# Patient Record
Sex: FEMALE | URBAN - METROPOLITAN AREA
[De-identification: names, ages, dates, MRNs, and addresses within clinical notes are randomized per-mention and may not be internally consistent; named-entity substitution may affect disease eponyms.]

---
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
## 2022-02-16 NOTE — TELEPHONE ENCOUNTER
---------------------  From: Luisa Linder MD   To: [employer] ([id number]_[place]);     Sent: 10/26/2021 4:49:54 PM CDT  Subject: p     pt's dad is unable to get the phone to connect with video for our appointment, pt is autistic and does not speak on the phone so will need to have appointment cancelled and a clinic appointment scheduled.  did not hear any coughing in the background during my brief conversation with dad.  chart review shows pt's dad has requested inhaler and promethazine with codeine for patient in the past.      will have appointment today be cancelled and  have patient be brought in for a clinic appointment for further evaluation and treamentAppointment cancelled.

## 2022-02-16 NOTE — NURSING NOTE
Comprehensive Intake Entered On:  10/26/2021 2:35 PM CDT    Performed On:  10/26/2021 2:31 PM CDT by Brenda Prasad               Summary   Chief Complaint :   Verbal consent given for video visit. c/o allergies- itchy eyes, runny nose, chest congestion.    Brenda Prasad - 10/26/2021 2:31 PM CDT   Health Status   Allergies Verified? :   Yes   Medication History Verified? :   Yes   Medical History Verified? :   Yes   Pre-Visit Planning Status :   Completed   Tobacco Use? :   Never smoker   Brenda Prasad - 10/26/2021 2:31 PM CDT   Consents   Consent for Immunization Exchange :   Consent Granted   Consent for Immunizations to Providers :   Consent Granted   Brneda Prasad - 10/26/2021 2:31 PM CDT   Meds / Allergies   (As Of: 10/26/2021 2:35:03 PM CDT)   Allergies (Active)   No Known Medication Allergies  Estimated Onset Date:   Unspecified ; Created By:   Carolina Niño; Reaction Status:   Active ; Category:   Drug ; Substance:   No Known Medication Allergies ; Type:   Allergy ; Updated By:   Carolina Niño; Reviewed Date:   10/26/2021 2:33 PM CDT        Medication List   (As Of: 10/26/2021 2:35:03 PM CDT)   No Known Home Medications     Brenda Prasad - 10/26/2021 2:33:32 PM      Prescription/Discharge Order    albuterol  :   albuterol ; Status:   Completed ; Ordered As Mnemonic:   albuterol 90 mcg/inh inhalation aerosol ; Simple Display Line:   2 puff(s), Inhale, q4-6 hrs, PRN: for cough, 1 EA, 2 Refill(s) ; Ordering Provider:   Sage Wang MD; Catalog Code:   albuterol ; Order Dt/Tm:   5/19/2021 12:12:00 PM CDT          codeine-promethazine  :   codeine-promethazine ; Status:   Completed ; Ordered As Mnemonic:   codeine-promethazine 10 mg-6.25 mg/5 mL oral syrup ; Simple Display Line:   5 mL, Oral, hs, 240 mL, 0 Refill(s) ; Ordering Provider:   Sage Wang MD; Catalog Code:   codeine-promethazine ; Order Dt/Tm:   5/19/2021 12:11:47 PM CDT          loratadine  :   loratadine ; Status:    Completed ; Ordered As Mnemonic:   loratadine 10 mg oral capsule ; Simple Display Line:   10 mg, 1 cap(s), Oral, daily, for 90 day(s), 90 cap(s), 1 Refill(s) ; Ordering Provider:   Sage Wang MD; Catalog Code:   loratadine ; Order Dt/Tm:   4/26/2021 4:03:36 PM CDT          montelukast  :   montelukast ; Status:   Completed ; Ordered As Mnemonic:   Singulair 10 mg oral tablet ; Simple Display Line:   10 mg, 1 tab(s), Oral, daily, for 90 day(s), 90 tab(s), 1 Refill(s) ; Ordering Provider:   Sage Wang MD; Catalog Code:   montelukast ; Order Dt/Tm:   4/26/2021 4:03:37 PM CDT

## 2022-02-16 NOTE — NURSING NOTE
Comprehensive Intake Entered On:  4/26/2021 2:25 PM CDT    Performed On:  4/26/2021 2:24 PM CDT by Carolina Niño               Summary   Chief Complaint :   Cough and congestion with gree/yellow mucus   Carolina Niño - 4/26/2021 2:24 PM CDT   Health Status   Allergies Verified? :   Yes   Medication History Verified? :   Yes   Medical History Verified? :   No   Pre-Visit Planning Status :   Not completed   Carolina Niño - 4/26/2021 2:24 PM CDT   Consents   Consent for Immunization Exchange :   Consent Granted   Consent for Immunizations to Providers :   Consent Granted   Carolina Niño - 4/26/2021 2:24 PM CDT   Meds / Allergies   (As Of: 4/26/2021 2:25:38 PM CDT)   Allergies (Active)   No Known Medication Allergies  Estimated Onset Date:   Unspecified ; Created By:   Carolina Niño; Reaction Status:   Active ; Category:   Drug ; Substance:   No Known Medication Allergies ; Type:   Allergy ; Updated By:   Carolina Niño; Reviewed Date:   4/26/2021 2:25 PM CDT        Medication List   (As Of: 4/26/2021 2:25:38 PM CDT)   Home Meds    montelukast  :   montelukast ; Status:   Documented ; Ordered As Mnemonic:   Singulair 10 mg oral tablet ; Simple Display Line:   10 mg, 1 tab(s), Oral, daily, 0 Refill(s) ; Catalog Code:   montelukast ; Order Dt/Tm:   4/26/2021 2:20:18 PM CDT          loratadine  :   loratadine ; Status:   Documented ; Ordered As Mnemonic:   loratadine 10 mg oral capsule ; Simple Display Line:   10 mg, 1 cap(s), Oral, daily, 0 Refill(s) ; Catalog Code:   loratadine ; Order Dt/Tm:   4/26/2021 2:21:32 PM CDT          codeine-promethazine  :   codeine-promethazine ; Status:   Documented ; Ordered As Mnemonic:   codeine-promethazine ; Simple Display Line:   Oral, q4 hrs, 0 Refill(s) ; Catalog Code:   codeine-promethazine ; Order Dt/Tm:   4/26/2021 2:22:27 PM CDT          albuterol  :   albuterol ; Status:   Documented ; Ordered As Mnemonic:   Ventolin HFA ; Simple Display Line:   Inhale, q6 hrs, 0 Refill(s) ;  Catalog Code:   albuterol ; Order Dt/Tm:   4/26/2021 2:19:51 PM CDT            ID Risk Screen   Recent Travel History :   No recent travel   Family Member Travel History :   No recent travel   Other Exposure to Infectious Disease :   Unknown   COVID-19 Testing Status :   No positive COVID-19 test   Carolina Niño - 4/26/2021 2:24 PM CDT   Social History   Social History   (As Of: 4/26/2021 2:25:38 PM CDT)   Tobacco:        Never (less than 100 in lifetime)   (Last Updated: 4/26/2021 2:24:57 PM CDT by Carolina Niño)          Electronic Cigarette/Vaping:        Electronic Cigarette Use: Never.   (Last Updated: 4/26/2021 2:25:02 PM CDT by Carolina Niño)

## 2022-02-16 NOTE — PROGRESS NOTES
Patient:   SHIRA DEVINE            MRN: [id number]            FIN: [id number]               Age:   21 years     Sex:  Female     :  2000   Associated Diagnoses:   Autism; Cough   Author:   Sage Wang MD      Visit Information      Date of Service: 2021 02:08 pm  Performing Location: [facility]  Encounter#: [id number]      Primary Care Provider (PCP):  NONE ,       Referring Provider:  Sage Wang MD    NPI# [id number]      Chief Complaint   2021 2:24 PM CDT    Cough and congestion with gree/yellow mucus      Subjective   Chief complaint 2021 2:24 PM CDT    Cough and congestion with gree/yellow mucus.     history of asthma and uses meds but has run out.  now cough and worsening congestion,  mom   and  in a few days  patient is autistic so visit done with dad.  he does not think nasal testing is alternative    video visit from 210 to 220. patient had consented to video         Health Status   Allergies:    Allergic Reactions (Selected)  No Known Medication Allergies   Medications:  (Selected)   Prescriptions  Prescribed  Singulair 10 mg oral tablet: = 1 tab(s) ( 10 mg ), Oral, daily, # 90 tab(s), 1 Refill(s), Type: Maintenance, Pharmacy: [facility] #[id number], 1 tab(s) Oral daily,x90 day(s)  albuterol 90 mcg/inh inhalation aerosol: 2 puff(s), Inhale, q4-6 hrs, PRN: for cough, # 1 EA, 2 Refill(s), Type: Maintenance, Pharmacy: [facility] #[id number], 2 puff(s) Inhale q4-6 hrs,PRN:for cough  codeine-promethazine 10 mg-6.25 mg/5 mL oral syrup: 5 mL, Oral, hs, # 120 mL, 0 Refill(s), Type: Acute, Pharmacy: [facility] #[id number], 5 mL Oral hs  loratadine 10 mg oral capsule: = 1 cap(s) ( 10 mg ), Oral, daily, # 90 cap(s), 1 Refill(s), Type: Maintenance, Pharmacy: [facility] #[id number], 1 cap(s) Oral daily,x90 day(s)  predniSONE 20 mg oral tablet: = 1 tab(s) ( 20 mg ), Oral, daily, x 7 day(s), # 7 tab(s), 0 Refill(s), Type: Acute, Pharmacy:  [facility] #[id number], 1 tab(s) Oral daily,x7 day(s)  Documented Medications  Documented  Ventolin HFA: Inhale, q6 hrs, 0 Refill(s), Type: Maintenance,    Medications          *denotes recorded medication          *Ventolin HFA: Inhale, q6 hrs, 0 Refill(s).          albuterol 90 mcg/inh inhalation aerosol: 2 puff(s), Inhale, q4-6 hrs, PRN: for cough, 1 EA, 2 Refill(s).          codeine-promethazine 10 mg-6.25 mg/5 mL oral syrup: 5 mL, Oral, hs, 120 mL, 0 Refill(s).          loratadine 10 mg oral capsule: 10 mg, 1 cap(s), Oral, daily, for 90 day(s), 90 cap(s), 1 Refill(s).          Singulair 10 mg oral tablet: 10 mg, 1 tab(s), Oral, daily, for 90 day(s), 90 tab(s), 1 Refill(s).          predniSONE 20 mg oral tablet: 20 mg, 1 tab(s), Oral, daily, for 7 day(s), 7 tab(s), 0 Refill(s).       Problem list:    All Problems  Autism / [id number] / Confirmed      Impression and Plan   Assessment and Plan:          Diagnosis: Autism (YJD30-RV F84.0), Cough (UXX13-SN R05).    Orders      (Selected)   Prescriptions  Prescribed  Singulair 10 mg oral tablet: = 1 tab(s) ( 10 mg ), Oral, daily, # 90 tab(s), 1 Refill(s), Type: Maintenance, Pharmacy: [facility] #[id number], 1 tab(s) Oral daily,x90 day(s)  albuterol 90 mcg/inh inhalation aerosol: 2 puff(s), Inhale, q4-6 hrs, PRN: for cough, # 1 EA, 2 Refill(s), Type: Maintenance, Pharmacy: [facility] #[id number], 2 puff(s) Inhale q4-6 hrs,PRN:for cough  codeine-promethazine 10 mg-6.25 mg/5 mL oral syrup: 5 mL, Oral, hs, # 120 mL, 0 Refill(s), Type: Acute, Pharmacy: [facility] #[id number], 5 mL Oral hs  loratadine 10 mg oral capsule: = 1 cap(s) ( 10 mg ), Oral, daily, # 90 cap(s), 1 Refill(s), Type: Maintenance, Pharmacy: [facility] #[id number], 1 cap(s) Oral daily,x90 day(s)  predniSONE 20 mg oral tablet: = 1 tab(s) ( 20 mg ), Oral, daily, x 7 day(s), # 7 tab(s), 0 Refill(s), Type: Acute, Pharmacy: [facility] #[id number], 1 tab(s) Oral daily,x7 day(s).      will have patient seen if not better or if further symptoms.     .

## 2022-02-16 NOTE — PROGRESS NOTES
"   Patient:   SHIRA EDVINE            MRN: [id number]            FIN: [id number]               Age:   21 years     Sex:  Female     :  2000   Associated Diagnoses:   Autism; Bronchitis; Cough   Author:   Dalton PICKARD, Angel FLORES      Visit Information      Date of Service: 2021 11:00 am  Performing Location: [facility]  Encounter#: [id number]      Primary Care Provider (PCP):  NONE ,    Visit type:  Video Visit via [employer] or [employer].    Participants in room during visit:  2 (patient and her father)_   Location of patient:  _home  Location of provider:  _ (Clinic office   Video Start Time:  _1138  Video End Time:   _1145    Today's visit was conducted via video conference due to the COVID-19 pandemic.  The patient's consent to proceed with a video visit has been obtained and documented.      Chief Complaint   2021 11:29 AM CDT    Verbal consent given for a video visit.  Pt Father states pt is still \"coughing pretty bad\" and states she needs \"more medications\" for upcoming \"autism camp\"        History of Present Illness   Patient is a _21 year old female_ who is being evaluated via a billable video visit.  patient has autism, father provides most of the history for the visit  she had video visit on  for cough, was given albuterol MDI, singulair, loratadine and burst of prednisone, her sxs improved a little    she had negative Covid test on 5/3  she is still coughing a lot, and still has some chest congestion  she will leaving today for autism camp for 2 weeks      Review of Systems   Constitutional:  Negative.    Ear/Nose/Mouth/Throat:  Nasal congestion.    Respiratory:  Cough.       Health Status   Allergies:    Allergic Reactions (Selected)  No Known Medication Allergies   Medications:  (Selected)   Prescriptions  Prescribed  Singulair 10 mg oral tablet: = 1 tab(s) ( 10 mg ), Oral, daily, # 90 tab(s), 1 Refill(s), Type: Maintenance, Pharmacy: [facility] #[id number], 1 tab(s) Oral " daily,x90 day(s)  albuterol 90 mcg/inh inhalation aerosol: 2 puff(s), Inhale, q4-6 hrs, PRN: for cough, # 1 EA, 2 Refill(s), Type: Maintenance, Pharmacy: [facility] #[id number], 2 puff(s) Inhale q4-6 hrs,PRN:for cough  codeine-promethazine 10 mg-6.25 mg/5 mL oral syrup: 5 mL, Oral, hs, # 120 mL, 0 Refill(s), Type: Acute, Pharmacy: [facility] #[id number], 5 mL Oral hs  loratadine 10 mg oral capsule: = 1 cap(s) ( 10 mg ), Oral, daily, # 90 cap(s), 1 Refill(s), Type: Maintenance, Pharmacy: [facility] #[id number], 1 cap(s) Oral daily,x90 day(s)   Problem list:    All Problems  Autism / SNOMED CT [id number] / Confirmed      Histories   Past Medical History:    No active or resolved past medical history items have been selected or recorded.   Family History:    No family history items have been selected or recorded.   Procedure history:    No active procedure history items have been selected or recorded.   Social History:        Electronic Cigarette/Vaping Assessment            Electronic Cigarette Use: Never.      Tobacco Assessment            Never (less than 100 in lifetime)        Physical Examination   General:  No acute distress.    Respiratory:  Respirations are non-labored.    Psychiatric:  Cooperative, Appropriate mood & affect, Normal judgment.       Impression and Plan   Diagnosis     Autism (XAE26-OC F84.0).     Bronchitis (VCC29-KL J40).     Cough (ZRX12-NK R05).     Summary:  continue with current medications, will add zpak and will refill her codeine-promethazine cough syrup  follow up if not improving.    Orders     Orders   Pharmacy:  Zithromax Z-Jeremiah 250 mg oral tablet (Prescribe): Take 2 tablets on Day 1 and then 1 tablet, Oral, daily, Instructions: until finished, # 6 tab(s), 0 Refill(s), Type: Maintenance, Pharmacy: [facility] #[id number], Take 2 tablets on Day 1 and then 1 tablet Oral daily,Instr:until finished.     Orders   Pharmacy:  codeine-promethazine 10 mg-6.25 mg/5 mL oral  syrup (Prescribe): 5 mL, Oral, hs, # 120 mL, 0 Refill(s), Type: Maintenance, Pharmacy: [facility] #[id number], 5 mL Oral hs  codeine-promethazine 10 mg-6.25 mg/5 mL oral syrup (Modify): 5 mL, Oral, hs, # 120 mL, 0 Refill(s), Type: Hard Stop, Pharmacy: [facility] #[id number].     Orders   Pharmacy:  Zithromax Z-Jeremiah 250 mg oral tablet (Prescribe): Take 2 tablets on Day 1 and then 1 tablet, Oral, daily, Instructions: until finished, # 6 tab(s), 0 Refill(s), Type: Maintenance, Pharmacy: [facility] #[id number], Take 2 tablets on Day 1 and then 1 tablet Oral daily,Instr:until finished  codeine-promethazine 10 mg-6.25 mg/5 mL oral syrup (Prescribe): 5 mL, Oral, hs, # 120 mL, 0 Refill(s), Type: Maintenance, Pharmacy: [facility] #[id number], 5 mL Oral hs  codeine-promethazine 10 mg-6.25 mg/5 mL oral syrup (Discontinue)  Zithromax Z-Jeremiah 250 mg oral tablet (Discontinue).     Orders   Charges (Evaluation and Management):  [id number] office o/p est low 20-29 min (Charge) (Order): Quantity: 1, Cough  Bronchitis  Autism.

## 2022-02-16 NOTE — TELEPHONE ENCOUNTER
---------------------  From: Albert Cook (Phone Messages Pool ([id number]))   To: [facility] Message Pool ([id number]);     Sent: 4/26/2021 4:03:24 PM CDT !  Subject: General Message     Phone Message    PCP:   ANNEL             Time of Call:  400pm                                        Person Calling:  Pt   Phone number:  ; ok to leave detailed message     Note:   [email or web address] (did [facility] Pt is also requesting [facility] to send 300mL of the Codeine (1 month supply) instead of 120mL. It is cheaper ($4) for a month supply vs paying $40 for 120 ml. Please advise.   He is on his way now to the pharmacy, he is waiting for a call back before going in.     Last office visit and reason:  04/26/2021; Cough.---------------------  From: Sangeetha Pond CMA ([facility] Message Pool ([id number]))   To: Sage Wang MD;     Sent: 4/26/2021 4:04:15 PM CDT  Subject: FW: General Message---------------------  From: Albert Cook (Phone Messages Pool ([id number]))   To: Phone Messages Pool ([id number]);     Sent: 4/26/2021 4:40:33 PM CDT  Subject: FW: General Message---------------------  From: Albert Cook (Phone Messages Pool ([id number]))   To: [facility] Message Pool ([id number]);     Sent: 4/26/2021 4:43:00 PM CDT  Subject: FW: General Message     Henrique from [facility] called stating that 300 mL is a very large amount to dispense. Henrique also informed me that pt lied about coverage, it is not expensive to get a few days supplies. Henrique is concerned since this medication can be sold for 100s of dollars just for small amounts. I told him to cancel the Rx and I'll have [facility] send in a few day supplies. I also called [facility] in [place] to cancel all Rxs since pt chose to come to the [facility] in [place], [place].---------------------  From: Sangeetha Pond CMA  Message Pool (32224_[facility]))   To: Sage Wang MD;     Sent: 4/26/2021 4:47:44 PM CDT  Subject: FW: General Message     FYI- sent the new request for the medication with your original amount you prescribed for you to sign off on.

## 2022-02-16 NOTE — TELEPHONE ENCOUNTER
---------------------  From: Katy Bee LPN (Phone Messages Pool ([id number]))   To: [facility] Provider Pool ([id number]_[facility]);     Sent: 5/19/2021 3:13:09 PM CDT  Subject: pharmacy change     ANNEL left for the day    Phone Message    PCP:   none      Time of Call:  3:01pm       Person Calling:  pt mat White  Phone number:  [phone]    Note:   Dad calling stating [facility] had sent Rx's to [facility] for promethazine with codeine and albuterol. Dad says [facility] does not carry promethazine with codeine anymore and he needs Rx sent to [facility] in [facility] (pharmacy added to pt's list).    Dad says pt is going to a camp and bus leaves in about 40 minutes so he needs this done ASAP.    Please advise if Rx can be resent to [facility] in [place]    Last office visit and reason:  5/7/1 video visit, cough. bronchitis, autism---------------------  From: Pedro PICKARD, Jania RIOS (Advanced Practice Provider Pool ([id number]))   To: Phone Messages Pool ([id number]);     Sent: 5/19/2021 3:33:44 PM CDT  Subject: RE: pharmacy change     1. Please call pt back and discuss with pt, not dad.   We do not have consent on file to talk to dad re: his care as he is 21.  I understand he has autism but we still do not have consent on file.   2.  He has filled all of the RX that we have provided him through [facility] and [facility] per [facility] database review, so I will not send more medication in.  I would not recommend ongoing use of promethazine with codeine.   3. If he is having ongoing cough he should be evaluated at the clinic in person.---------------------  From: Katy Bee LPN (Phone Messages Pool ([id number]))   To: [facility] Message Pool ([id number]);     Sent: 5/19/2021 3:47:52 PM CDT  Subject: FW: pharmacy change     Dad called back requesting Rx be sent elsewhere. Told dad we need to speak with pt since she is over 18. Dad says she is autistic and does not talk.  Dad became upset and hung up on me.    Will forward for [facility] to review tomorrow.---------------------  From: Sangeetha Pond CMA ([facility] Message Pool (32224_[facility]))   To: Sage Wang MD;     Sent: 5/20/2021 9:17:42 AM CDT  Subject: FW: pharmacy change---------------------  From: Sage Wang MD   To: [facility] Message Pool ([address]224_[place]);     Sent: 5/20/2021 9:32:34 AM CDT  Subject: RE: pharmacy change     due to messages from pharmacy I would need to see patient to consider any further RX

## 2022-02-16 NOTE — PROGRESS NOTES
Chief Complaint    Verbal consent given for video visit. Needing refill of albuterol inhaler and cough syrup. Is still at camp for 2 more weeks and cough has continued. Dad, Christopher, is currently with pt. Can send to [facility].  History of Present Illness       Telephone visit 1205 to 12:20 PM       Talked to dad.  His daughter is autistic and does not do well on the phone.  He would like a refill of her cough medicines and syrup.  She is evidently attending a 2-week day camp for autism.  I note that I talked to him on April 26 and the need a follow-up visit May 7 with her partner.  Assessment/Plan       1. Cough (R05)         Initially I agreed to refill the medication suggested he bring her in before any further refills.  Information from pharmacist in [facility] is that had a plethora of prescriptions for promethazine with codeine.  Someone in [facility] was buying it from other people.  So patient will need to come in for further discussion before prescribing   Patient Information     Name:SHIRA DEVINE      Address:      [address] [place] [address]     Sex:Female     YOB: 2000     [phone]     MRN:[id number]     FIN:[id number]     Location:[facility]     Date of Service:05/19/2021      Primary Care Physician:       NONE ,       Attending Physician:       Sage Wang MD, [phone]  Problem List/Past Medical History    Ongoing     Autism    Historical     No qualifying data  Medications    albuterol 90 mcg/inh inhalation aerosol, 2 puff(s), Inhale, q4-6 hrs, PRN, 2 refills    codeine-promethazine 10 mg-6.25 mg/5 mL oral syrup, 5 mL, Oral, hs    loratadine 10 mg oral capsule, 10 mg= 1 cap(s), Oral, daily, 1 refills    Singulair 10 mg oral tablet, 10 mg= 1 tab(s), Oral, daily, 1 refills  Allergies    No Known Medication Allergies  Social History    Smoking Status     Never smoker     Electronic Cigarette/Vaping      Electronic Cigarette Use: Never.     Tobacco      Never  (less than 100 in lifetime)

## 2022-02-16 NOTE — NURSING NOTE
Comprehensive Intake Entered On:  10/26/2021 4:36 PM CDT    Performed On:  10/26/2021 4:29 PM CDT by Marlen Christine               Summary   Chief Complaint :   Video Visit consent. Spoke with dad as pt has autism. He reports pt has cough, itchy eyes, nasal and chest congestion. She has been coughing up green and yellow phlegm x 2 weeks. .   Marlen Christine - 10/26/2021 4:29 PM CDT   Health Status   Allergies Verified? :   Yes   Medication History Verified? :   Yes   Marlen Christine - 10/26/2021 4:29 PM CDT   Consents   Consent for Immunization Exchange :   Consent Granted   Consent for Immunizations to Providers :   Consent Granted   Marlen Christine - 10/26/2021 4:29 PM CDT   Meds / Allergies   (As Of: 10/26/2021 4:36:01 PM CDT)   Allergies (Active)   No Known Medication Allergies  Estimated Onset Date:   Unspecified ; Created By:   Carolina Niño; Reaction Status:   Active ; Category:   Drug ; Substance:   No Known Medication Allergies ; Type:   Allergy ; Updated By:   Carolina Niño; Reviewed Date:   10/26/2021 4:34 PM CDT        Medication List   (As Of: 10/26/2021 4:36:01 PM CDT)   No Known Home Medications     Brenda Prasad  - 10/26/2021 2:33:32 PM      Prescription/Discharge Order    albuterol  :   albuterol ; Status:   Completed ; Ordered As Mnemonic:   albuterol 90 mcg/inh inhalation aerosol ; Simple Display Line:   2 puff(s), Inhale, q4-6 hrs, PRN: for cough, 1 EA, 2 Refill(s) ; Ordering Provider:   Sage Wang MD; Catalog Code:   albuterol ; Order Dt/Tm:   5/19/2021 12:12:00 PM CDT          codeine-promethazine  :   codeine-promethazine ; Status:   Completed ; Ordered As Mnemonic:   codeine-promethazine 10 mg-6.25 mg/5 mL oral syrup ; Simple Display Line:   5 mL, Oral, hs, 240 mL, 0 Refill(s) ; Ordering Provider:   Sage Wang MD; Catalog Code:   codeine-promethazine ; Order Dt/Tm:   5/19/2021 12:11:47 PM CDT          loratadine  :   loratadine ; Status:   Completed ; Ordered As Mnemonic:    loratadine 10 mg oral capsule ; Simple Display Line:   10 mg, 1 cap(s), Oral, daily, for 90 day(s), 90 cap(s), 1 Refill(s) ; Ordering Provider:   Sage Wang MD; Catalog Code:   loratadine ; Order Dt/Tm:   4/26/2021 4:03:36 PM CDT          montelukast  :   montelukast ; Status:   Completed ; Ordered As Mnemonic:   Singulair 10 mg oral tablet ; Simple Display Line:   10 mg, 1 tab(s), Oral, daily, for 90 day(s), 90 tab(s), 1 Refill(s) ; Ordering Provider:   Sage Wang MD; Catalog Code:   montelukast ; Order Dt/Tm:   4/26/2021 4:03:37 PM CDT

## 2022-02-16 NOTE — TELEPHONE ENCOUNTER
---------------------  From: Soledad Nieto CMA   To: ЕЛЕНА Message Pool ([id number]_[facility]);     Sent: 5/7/2021 1:09:35 PM CDT  Subject: General Message-cough syrup     Phone Message    PCP:   ЕЛЕНА      Time of Call:  1300       Person Calling:  dad  Phone number:  [phone]    Returned call at: [phone]    Note:   calling stating pharm didn't have the cough syrup and would need to order for tomorrow.  Asking if it could be resent to [address].  Proposal sent for [facility] and renewed.  Daren contacted     Last office visit and reason:  5/7noted.  Alexi Multani CMA

## 2022-02-16 NOTE — NURSING NOTE
Comprehensive Intake Entered On:  5/19/2021 12:08 PM CDT    Performed On:  5/19/2021 12:07 PM CDT by Kristina Feliciano CMA               Summary   Chief Complaint :   Verbal consent given for video visit. Needing refill of albuterol inhaler and cough syrup. Is still at [place] for 2 more weeks and cough has continued. Christopher Mercedes, is currently with pt. Can send to [facility].    Kristina Feliciano CMA - 5/19/2021 12:07 PM CDT   Health Status   Allergies Verified? :   Yes   Medication History Verified? :   Yes   Pre-Visit Planning Status :   Not completed   Kristina Feliciano CMA - 5/19/2021 12:07 PM CDT   Meds / Allergies   (As Of: 5/19/2021 12:08:35 PM CDT)   Allergies (Active)   No Known Medication Allergies  Estimated Onset Date:   Unspecified ; Created By:   Carolina Niño; Reaction Status:   Active ; Category:   Drug ; Substance:   No Known Medication Allergies ; Type:   Allergy ; Updated By:   Carolina Niño; Reviewed Date:   4/26/2021 3:26 PM CDT        Medication List   (As Of: 5/19/2021 12:08:35 PM CDT)   Prescription/Discharge Order    albuterol  :   albuterol ; Status:   Prescribed ; Ordered As Mnemonic:   albuterol 90 mcg/inh inhalation aerosol ; Simple Display Line:   2 puff(s), Inhale, q4-6 hrs, PRN: for cough, 1 EA, 2 Refill(s) ; Ordering Provider:   Sage Wang MD; Catalog Code:   albuterol ; Order Dt/Tm:   4/26/2021 4:03:36 PM CDT          azithromycin  :   azithromycin ; Status:   Processing ; Ordered As Mnemonic:   Zithromax Z-Jeremiah 250 mg oral tablet ; Ordering Provider:   Angel Hoffman PA-C; Action Display:   Complete ; Catalog Code:   azithromycin ; Order Dt/Tm:   5/19/2021 12:08:26 PM CDT          codeine-promethazine  :   codeine-promethazine ; Status:   Prescribed ; Ordered As Mnemonic:   codeine-promethazine 10 mg-6.25 mg/5 mL oral syrup ; Simple Display Line:   5 mL, Oral, hs, 120 mL, 0 Refill(s) ; Ordering Provider:   Angel Hoffman PA-C; Catalog Code:   codeine-promethazine ; Order Dt/Tm:    5/7/2021 1:05:45 PM CDT          loratadine  :   loratadine ; Status:   Prescribed ; Ordered As Mnemonic:   loratadine 10 mg oral capsule ; Simple Display Line:   10 mg, 1 cap(s), Oral, daily, for 90 day(s), 90 cap(s), 1 Refill(s) ; Ordering Provider:   Sage Wang MD; Catalog Code:   loratadine ; Order Dt/Tm:   4/26/2021 4:03:36 PM CDT          montelukast  :   montelukast ; Status:   Prescribed ; Ordered As Mnemonic:   Singulair 10 mg oral tablet ; Simple Display Line:   10 mg, 1 tab(s), Oral, daily, for 90 day(s), 90 tab(s), 1 Refill(s) ; Ordering Provider:   Sage Wang MD; Catalog Code:   montelukast ; Order Dt/Tm:   4/26/2021 4:03:37 PM CDT            ID Risk Screen   Recent Travel History :   No recent travel   Family Member Travel History :   No recent travel   Other Exposure to Infectious Disease :   Unknown   COVID-19 Testing Status :   No COVID-19 test performed   Kristina Feliciano CMA - 5/19/2021 12:07 PM CDT

## 2022-02-16 NOTE — TELEPHONE ENCOUNTER
---------------------  From: Luisa Linder MD   To: EMELY Message Pool ([id number] [facility]);     Sent: 10/26/2021 4:54:27 PM CDT  Subject: General Message     please add chart alert, pt unable to do telemed appointments, must be seen in clinic due to autism and dad's phone unable to connect to video, also needs examination before codeine cough syrup reneweddone

## 2022-02-16 NOTE — NURSING NOTE
"Comprehensive Intake Entered On:  5/7/2021 11:33 AM CDT    Performed On:  5/7/2021 11:29 AM CDT by Alexi Multani CMA               Summary   Chief Complaint :   Verbal consent given for a video visit.  Pt Father states pt is still \"coughing pretty bad\" and states she needs \"more medications\" for upcoming \"autism camp\"   RangelAlexi barraza CMA - 5/7/2021 11:29 AM CDT   Health Status   Allergies Verified? :   Yes   Medication History Verified? :   Yes   Medical History Verified? :   Yes   Pre-Visit Planning Status :   Not completed   Tobacco Use? :   Never smoker   Alexi Multani CMA - 5/7/2021 11:29 AM CDT   Meds / Allergies   (As Of: 5/7/2021 11:33:02 AM CDT)   Allergies (Active)   No Known Medication Allergies  Estimated Onset Date:   Unspecified ; Created By:   Carolina Niño; Reaction Status:   Active ; Category:   Drug ; Substance:   No Known Medication Allergies ; Type:   Allergy ; Updated By:   Carolina Niño; Reviewed Date:   4/26/2021 3:26 PM CDT        Medication List   (As Of: 5/7/2021 11:33:02 AM CDT)   Prescription/Discharge Order    codeine-promethazine  :   codeine-promethazine ; Status:   Prescribed ; Ordered As Mnemonic:   codeine-promethazine 10 mg-6.25 mg/5 mL oral syrup ; Simple Display Line:   5 mL, Oral, hs, 120 mL, 0 Refill(s) ; Ordering Provider:   Sage Wang MD; Catalog Code:   codeine-promethazine ; Order Dt/Tm:   4/26/2021 5:00:35 PM CDT          albuterol  :   albuterol ; Status:   Prescribed ; Ordered As Mnemonic:   albuterol 90 mcg/inh inhalation aerosol ; Simple Display Line:   2 puff(s), Inhale, q4-6 hrs, PRN: for cough, 1 EA, 2 Refill(s) ; Ordering Provider:   Sage Wang MD; Catalog Code:   albuterol ; Order Dt/Tm:   4/26/2021 4:03:36 PM CDT          loratadine  :   loratadine ; Status:   Prescribed ; Ordered As Mnemonic:   loratadine 10 mg oral capsule ; Simple Display Line:   10 mg, 1 cap(s), Oral, daily, for 90 day(s), 90 cap(s), 1 Refill(s) ; Ordering Provider:   " Sage Wang MD; Catalog Code:   loratadine ; Order Dt/Tm:   4/26/2021 4:03:36 PM CDT          montelukast  :   montelukast ; Status:   Prescribed ; Ordered As Mnemonic:   Singulair 10 mg oral tablet ; Simple Display Line:   10 mg, 1 tab(s), Oral, daily, for 90 day(s), 90 tab(s), 1 Refill(s) ; Ordering Provider:   Sage Wang MD; Catalog Code:   montelukast ; Order Dt/Tm:   4/26/2021 4:03:37 PM CDT            Home Meds    albuterol  :   albuterol ; Status:   Processing ; Ordered As Mnemonic:   Ventolin HFA ; Action Display:   Complete ; Catalog Code:   albuterol ; Order Dt/Tm:   5/7/2021 11:31:43 AM CDT            ID Risk Screen   Recent Travel History :   No recent travel   Family Member Travel History :   No recent travel   Other Exposure to Infectious Disease :   Unknown   COVID-19 Testing Status :   No positive COVID-19 test   Alexi Multani CMA - 5/7/2021 11:29 AM CDT